# Patient Record
Sex: MALE | Race: WHITE | NOT HISPANIC OR LATINO
[De-identification: names, ages, dates, MRNs, and addresses within clinical notes are randomized per-mention and may not be internally consistent; named-entity substitution may affect disease eponyms.]

---

## 2023-01-03 PROBLEM — Z00.00 ENCOUNTER FOR PREVENTIVE HEALTH EXAMINATION: Status: ACTIVE | Noted: 2023-01-03

## 2023-01-19 ENCOUNTER — NON-APPOINTMENT (OUTPATIENT)
Age: 63
End: 2023-01-19

## 2023-01-19 ENCOUNTER — APPOINTMENT (OUTPATIENT)
Dept: GASTROENTEROLOGY | Facility: CLINIC | Age: 63
End: 2023-01-19
Payer: COMMERCIAL

## 2023-01-19 VITALS
TEMPERATURE: 97.5 F | WEIGHT: 166 LBS | SYSTOLIC BLOOD PRESSURE: 118 MMHG | HEART RATE: 55 BPM | DIASTOLIC BLOOD PRESSURE: 62 MMHG | BODY MASS INDEX: 23.24 KG/M2 | HEIGHT: 71 IN

## 2023-01-19 DIAGNOSIS — Z12.11 ENCOUNTER FOR SCREENING FOR MALIGNANT NEOPLASM OF COLON: ICD-10-CM

## 2023-01-19 DIAGNOSIS — R19.4 CHANGE IN BOWEL HABIT: ICD-10-CM

## 2023-01-19 DIAGNOSIS — Z80.0 FAMILY HISTORY OF MALIGNANT NEOPLASM OF DIGESTIVE ORGANS: ICD-10-CM

## 2023-01-19 PROCEDURE — 99203 OFFICE O/P NEW LOW 30 MIN: CPT

## 2023-01-19 RX ORDER — ASCORBIC ACID 500 MG
500 TABLET ORAL
Refills: 0 | Status: ACTIVE | COMMUNITY

## 2023-01-19 NOTE — ASSESSMENT
[FreeTextEntry1] : patient is due for his first colonoscopy in thirty years\par his father  of colon cancer\par \par his father was Julius Forbes, aged 86.\par \par and TOms father, Jefe grandfather, also had ca of the colon\par \par he is otherwise seemingly in very good medical health, \par but gets his medical care from [facility]\par no prescription meds\par very active\par has three healthy sons\par \par AS WE OBTAIN INFORMED CONSENT FOR COLONOSCOPY, UPPER ENDOSCOPY [EGD], OR BOTH PROCEDURES TOGETHER;\par \par As with all procedures, there are risks of which the patient should be aware\par \par 1.  Anesthesia; deep sedation with Propofol;  there is a small risk of aspiration and pulmonary infection.  The Anesthesiologist meets with the patient the morning of the procedure to discuss in more detail\par \par 2.  risk of bleeding; from removal of a polyp, or rarely, from biopsies, 1 % or less\par \par 3.  risk of injury or perforation of the colon or upper GI tract; one in a thousand or less,  from removing a polyp or from advancing the instrument\par \par \par brothers have had polyps, and there is a strong fh of ca of colon, two generation

## 2023-01-19 NOTE — HISTORY OF PRESENT ILLNESS
[FreeTextEntry1] : this is a [age] y [age] gentleman\par intermittent abdominal pain, cramps, diarrhea, sending him from work\par \par it can happen a few times per week, every two weks, or more , can wake him, non bloody \par \par no surgery other than two hip surgeries..three and eight y ago\par \par had hx of bleeding peptic ulcer.\par \par had a colonoscopy thirty years ago, none since, i did it\par \par no smoking no drinking\par \par no heartburn\par no angina, no exertional chest pain\par exercises on a daily basis, rides a bike, lifts weights , boxes..and no chest pain is experienced\par \par usually his stools have been ok, except for the diarrhea intermittently, but other times the stools are on the soft side,\par this is going on on since early october

## 2023-01-27 ENCOUNTER — RESULT REVIEW (OUTPATIENT)
Age: 63
End: 2023-01-27

## 2023-01-29 ENCOUNTER — RESULT REVIEW (OUTPATIENT)
Age: 63
End: 2023-01-29

## 2023-01-30 ENCOUNTER — APPOINTMENT (OUTPATIENT)
Dept: GASTROENTEROLOGY | Facility: HOSPITAL | Age: 63
End: 2023-01-30

## 2023-01-30 ENCOUNTER — TRANSCRIPTION ENCOUNTER (OUTPATIENT)
Age: 63
End: 2023-01-30

## 2023-02-04 ENCOUNTER — NON-APPOINTMENT (OUTPATIENT)
Age: 63
End: 2023-02-04

## 2023-04-11 ENCOUNTER — APPOINTMENT (OUTPATIENT)
Dept: GASTROENTEROLOGY | Facility: CLINIC | Age: 63
End: 2023-04-11

## 2023-05-04 ENCOUNTER — APPOINTMENT (OUTPATIENT)
Dept: GASTROENTEROLOGY | Facility: CLINIC | Age: 63
End: 2023-05-04
Payer: COMMERCIAL

## 2023-05-04 DIAGNOSIS — K52.832 LYMPHOCYTIC COLITIS: ICD-10-CM

## 2023-05-04 PROCEDURE — 99443: CPT

## 2023-05-05 ENCOUNTER — APPOINTMENT (OUTPATIENT)
Dept: GASTROENTEROLOGY | Facility: CLINIC | Age: 63
End: 2023-05-05

## 2023-05-07 PROBLEM — K52.832 LYMPHOCYTIC-PLASMACYTIC COLITIS: Status: ACTIVE | Noted: 2023-05-07

## 2023-05-07 NOTE — ASSESSMENT
[FreeTextEntry1] : 1.  lymphocytic colitis\par we discussed the significance; discussed the use of peptobismol as a first agent for therapy, budesonide later on\par and we talked about the best regimen\par i suggested a longer initial course of pepto six packets per day, and a slower taper from the six \par 2.  also, trying to limit use of nsaids, and in stead try tylenol 500 mg x 2, two or three doses per day\par \par he doesn’t use alcohol, so i feel that the use of this amount of tylenol is safe

## 2023-05-07 NOTE — HISTORY OF PRESENT ILLNESS
[FreeTextEntry1] : telephonic visit;  consent on file\par patient is at home\par i am at\par [address]\[place], [id number],  \par my office\par \par 1  lymphocytic colitis, the big problem is ongoing need for nsaid therapy because of osteoarthritis\par this is a big problem as he needs nsaids frequently\par \par he has done well with his lymphocyttic colitis, using peptobismol, starting with two packets three times per day, although he tapered the dose a bit rapidly\par and now he is on just a small dose \par \par perhaps just one or two packets per day\par his symptoms are well controlled but when he starts using ibuprofen he can get diarrhea